# Patient Record
Sex: FEMALE | Race: BLACK OR AFRICAN AMERICAN | NOT HISPANIC OR LATINO | URBAN - METROPOLITAN AREA
[De-identification: names, ages, dates, MRNs, and addresses within clinical notes are randomized per-mention and may not be internally consistent; named-entity substitution may affect disease eponyms.]

---

## 2024-12-13 ENCOUNTER — PHARMACY VISIT (OUTPATIENT)
Dept: PHARMACY | Facility: CLINIC | Age: 39
End: 2024-12-13
Payer: COMMERCIAL

## 2024-12-13 ENCOUNTER — INITIAL PRENATAL (OUTPATIENT)
Dept: OBSTETRICS AND GYNECOLOGY | Facility: CLINIC | Age: 39
End: 2024-12-13
Payer: COMMERCIAL

## 2024-12-13 ENCOUNTER — LAB (OUTPATIENT)
Dept: LAB | Facility: LAB | Age: 39
End: 2024-12-13
Payer: COMMERCIAL

## 2024-12-13 VITALS
DIASTOLIC BLOOD PRESSURE: 101 MMHG | BODY MASS INDEX: 40.52 KG/M2 | SYSTOLIC BLOOD PRESSURE: 170 MMHG | WEIGHT: 243.5 LBS

## 2024-12-13 DIAGNOSIS — O10.911 PRE-EXISTING HYPERTENSION DURING PREGNANCY IN FIRST TRIMESTER, UNSPECIFIED PRE-EXISTING HYPERTENSION TYPE (HHS-HCC): ICD-10-CM

## 2024-12-13 DIAGNOSIS — O09.899 H/O PRETERM DELIVERY, CURRENTLY PREGNANT (HHS-HCC): ICD-10-CM

## 2024-12-13 DIAGNOSIS — N87.1 MODERATE DYSPLASIA OF CERVIX (CIN II): ICD-10-CM

## 2024-12-13 DIAGNOSIS — D86.9 SARCOIDOSIS: ICD-10-CM

## 2024-12-13 DIAGNOSIS — E66.01 OBESITY, CLASS III, BMI 40-49.9 (MORBID OBESITY) (MULTI): ICD-10-CM

## 2024-12-13 DIAGNOSIS — O09.521 MULTIGRAVIDA OF ADVANCED MATERNAL AGE IN FIRST TRIMESTER (HHS-HCC): ICD-10-CM

## 2024-12-13 DIAGNOSIS — O26.851 SPOTTING AFFECTING PREGNANCY IN FIRST TRIMESTER (HHS-HCC): ICD-10-CM

## 2024-12-13 DIAGNOSIS — Z32.01 PREGNANCY TEST POSITIVE (HHS-HCC): ICD-10-CM

## 2024-12-13 DIAGNOSIS — O09.299 HISTORY OF PRE-ECLAMPSIA IN PRIOR PREGNANCY, CURRENTLY PREGNANT (HHS-HCC): ICD-10-CM

## 2024-12-13 DIAGNOSIS — Z32.01 PREGNANCY TEST POSITIVE (HHS-HCC): Primary | ICD-10-CM

## 2024-12-13 DIAGNOSIS — Z3A.01 6 WEEKS GESTATION OF PREGNANCY (HHS-HCC): ICD-10-CM

## 2024-12-13 PROBLEM — E66.813 OBESITY, CLASS III, BMI 40-49.9 (MORBID OBESITY): Status: ACTIVE | Noted: 2022-09-06

## 2024-12-13 PROBLEM — Z87.51 HISTORY OF PRETERM DELIVERY: Status: ACTIVE | Noted: 2024-12-13

## 2024-12-13 LAB
ABO GROUP (TYPE) IN BLOOD: NORMAL
ALBUMIN SERPL BCP-MCNC: 4.3 G/DL (ref 3.4–5)
ALP SERPL-CCNC: 99 U/L (ref 33–110)
ALT SERPL W P-5'-P-CCNC: 43 U/L (ref 7–45)
ANION GAP SERPL CALC-SCNC: 14 MMOL/L (ref 10–20)
ANTIBODY SCREEN: NORMAL
AST SERPL W P-5'-P-CCNC: 35 U/L (ref 9–39)
BILIRUB SERPL-MCNC: 1 MG/DL (ref 0–1.2)
BUN SERPL-MCNC: 7 MG/DL (ref 6–23)
CALCIUM SERPL-MCNC: 9.3 MG/DL (ref 8.6–10.6)
CHLORIDE SERPL-SCNC: 103 MMOL/L (ref 98–107)
CO2 SERPL-SCNC: 24 MMOL/L (ref 21–32)
CREAT SERPL-MCNC: 0.65 MG/DL (ref 0.5–1.05)
EGFRCR SERPLBLD CKD-EPI 2021: >90 ML/MIN/1.73M*2
ERYTHROCYTE [DISTWIDTH] IN BLOOD BY AUTOMATED COUNT: 13.7 % (ref 11.5–14.5)
GLUCOSE SERPL-MCNC: 87 MG/DL (ref 74–99)
HBV SURFACE AG SERPL QL IA: NONREACTIVE
HCT VFR BLD AUTO: 38.2 % (ref 36–46)
HCV AB SER QL: NONREACTIVE
HGB BLD-MCNC: 12.5 G/DL (ref 12–16)
HIV 1+2 AB+HIV1 P24 AG SERPL QL IA: NONREACTIVE
MCH RBC QN AUTO: 28.3 PG (ref 26–34)
MCHC RBC AUTO-ENTMCNC: 32.7 G/DL (ref 32–36)
MCV RBC AUTO: 86 FL (ref 80–100)
NRBC BLD-RTO: 0 /100 WBCS (ref 0–0)
PLATELET # BLD AUTO: 252 X10*3/UL (ref 150–450)
POTASSIUM SERPL-SCNC: 4 MMOL/L (ref 3.5–5.3)
PREGNANCY TEST URINE, POC: POSITIVE
PROT SERPL-MCNC: 7.1 G/DL (ref 6.4–8.2)
RBC # BLD AUTO: 4.42 X10*6/UL (ref 4–5.2)
REFLEX ADDED, ANEMIA PANEL: NORMAL
RH FACTOR (ANTIGEN D): NORMAL
RUBV IGG SERPL IA-ACNC: 2.3 IA
RUBV IGG SERPL QL IA: POSITIVE
SODIUM SERPL-SCNC: 137 MMOL/L (ref 136–145)
TREPONEMA PALLIDUM IGG+IGM AB [PRESENCE] IN SERUM OR PLASMA BY IMMUNOASSAY: NONREACTIVE
WBC # BLD AUTO: 3.2 X10*3/UL (ref 4.4–11.3)

## 2024-12-13 PROCEDURE — 86850 RBC ANTIBODY SCREEN: CPT

## 2024-12-13 PROCEDURE — 87086 URINE CULTURE/COLONY COUNT: CPT | Performed by: STUDENT IN AN ORGANIZED HEALTH CARE EDUCATION/TRAINING PROGRAM

## 2024-12-13 PROCEDURE — 81025 URINE PREGNANCY TEST: CPT | Mod: GC | Performed by: STUDENT IN AN ORGANIZED HEALTH CARE EDUCATION/TRAINING PROGRAM

## 2024-12-13 PROCEDURE — 86900 BLOOD TYPING SEROLOGIC ABO: CPT

## 2024-12-13 PROCEDURE — 86803 HEPATITIS C AB TEST: CPT

## 2024-12-13 PROCEDURE — RXMED WILLOW AMBULATORY MEDICATION CHARGE

## 2024-12-13 PROCEDURE — 86901 BLOOD TYPING SEROLOGIC RH(D): CPT

## 2024-12-13 PROCEDURE — 87389 HIV-1 AG W/HIV-1&-2 AB AG IA: CPT

## 2024-12-13 PROCEDURE — 83021 HEMOGLOBIN CHROMOTOGRAPHY: CPT

## 2024-12-13 PROCEDURE — 85027 COMPLETE CBC AUTOMATED: CPT

## 2024-12-13 PROCEDURE — 87591 N.GONORRHOEAE DNA AMP PROB: CPT | Performed by: STUDENT IN AN ORGANIZED HEALTH CARE EDUCATION/TRAINING PROGRAM

## 2024-12-13 PROCEDURE — 83020 HEMOGLOBIN ELECTROPHORESIS: CPT | Performed by: NURSE PRACTITIONER

## 2024-12-13 PROCEDURE — 80053 COMPREHEN METABOLIC PANEL: CPT

## 2024-12-13 PROCEDURE — 86317 IMMUNOASSAY INFECTIOUS AGENT: CPT

## 2024-12-13 PROCEDURE — 87340 HEPATITIS B SURFACE AG IA: CPT

## 2024-12-13 PROCEDURE — 99214 OFFICE O/P EST MOD 30 MIN: CPT | Performed by: STUDENT IN AN ORGANIZED HEALTH CARE EDUCATION/TRAINING PROGRAM

## 2024-12-13 PROCEDURE — 81025 URINE PREGNANCY TEST: CPT | Performed by: STUDENT IN AN ORGANIZED HEALTH CARE EDUCATION/TRAINING PROGRAM

## 2024-12-13 PROCEDURE — 99214 OFFICE O/P EST MOD 30 MIN: CPT | Mod: GC | Performed by: STUDENT IN AN ORGANIZED HEALTH CARE EDUCATION/TRAINING PROGRAM

## 2024-12-13 PROCEDURE — 86780 TREPONEMA PALLIDUM: CPT

## 2024-12-13 RX ORDER — NIFEDIPINE 30 MG/1
30 TABLET, FILM COATED, EXTENDED RELEASE ORAL
Qty: 30 TABLET | Refills: 11 | Status: SHIPPED | OUTPATIENT
Start: 2024-12-13 | End: 2025-12-13

## 2024-12-13 RX ORDER — ACETAMINOPHEN 500 MG
1 TABLET ORAL DAILY
Qty: 1 KIT | Refills: 0 | Status: SHIPPED | OUTPATIENT
Start: 2024-12-13 | End: 2024-12-13

## 2024-12-13 RX ORDER — ACETAMINOPHEN 500 MG
1 TABLET ORAL DAILY
Qty: 1 EACH | Refills: 0 | Status: SHIPPED | OUTPATIENT
Start: 2024-12-13

## 2024-12-13 RX ORDER — HYDROXYCHLOROQUINE SULFATE 200 MG/1
1 TABLET, FILM COATED ORAL 2 TIMES DAILY
COMMUNITY

## 2024-12-13 ASSESSMENT — ENCOUNTER SYMPTOMS
HEMATOLOGIC/LYMPHATIC NEGATIVE: 0
MUSCULOSKELETAL NEGATIVE: 0
GASTROINTESTINAL NEGATIVE: 0
CARDIOVASCULAR NEGATIVE: 0
ENDOCRINE NEGATIVE: 0
PSYCHIATRIC NEGATIVE: 0
NEUROLOGICAL NEGATIVE: 0
RESPIRATORY NEGATIVE: 0
ALLERGIC/IMMUNOLOGIC NEGATIVE: 0
CONSTITUTIONAL NEGATIVE: 0
EYES NEGATIVE: 0

## 2024-12-13 ASSESSMENT — EDINBURGH POSTNATAL DEPRESSION SCALE (EPDS)
I HAVE FELT SCARED OR PANICKY FOR NO GOOD REASON: NO, NOT AT ALL
I HAVE BEEN ANXIOUS OR WORRIED FOR NO GOOD REASON: NO, NOT AT ALL
TOTAL SCORE: 1
I HAVE BEEN ABLE TO LAUGH AND SEE THE FUNNY SIDE OF THINGS: AS MUCH AS I ALWAYS COULD
THE THOUGHT OF HARMING MYSELF HAS OCCURRED TO ME: NEVER
I HAVE BEEN SO UNHAPPY THAT I HAVE BEEN CRYING: NO, NEVER
I HAVE BLAMED MYSELF UNNECESSARILY WHEN THINGS WENT WRONG: NOT VERY OFTEN
I HAVE LOOKED FORWARD WITH ENJOYMENT TO THINGS: AS MUCH AS I EVER DID
I HAVE BEEN SO UNHAPPY THAT I HAVE HAD DIFFICULTY SLEEPING: NOT AT ALL
I HAVE FELT SAD OR MISERABLE: NO, NOT AT ALL
THINGS HAVE BEEN GETTING ON TOP OF ME: NO, I HAVE BEEN COPING AS WELL AS EVER

## 2024-12-13 NOTE — ASSESSMENT & PLAN NOTE
Patient with history of 36 week  birth P3   Hx of HSIL with [facility] on 3/2024, NAVI 2. Discussed increased risk PTB in this setting     /complementary therapies/nutritionist/outpatient care/rehabilitation therapy

## 2024-12-13 NOTE — ASSESSMENT & PLAN NOTE
[facility] at [facility] 3/2024, NAVI II, neg margins, has not yet had surveillance pap  For pap with cotest at next visit

## 2024-12-13 NOTE — PROGRESS NOTES
Subjective   Julia Denson is a 39 y.o.  at 6w4d with a working estimated date of delivery of 2025, by Last Menstrual Period who presents for an initial prenatal visit. This pregnancy is {pregnancy; planned/unplanned:00014}***.    Patient currently experiencing: {Pregnancy symptoms:54124}  Bleeding or cramping since LMP: {yes/no/unknown:36613}  Taking prenatal vitamin: {yes,no:41787}  Other concerns today:  Ultrasound completed this pregnancy: {YES wildcard/NO:60}  Last pap: ***    OB History    Para Term  AB Living   6 3 2 1 2 1   SAB IAB Ectopic Multiple Live Births   2       1      # Outcome Date GA Lbr Jason/2nd Weight Sex Type Anes PTL Lv   6 Current            5  12    F Vag-Spont None     4 Term 07/31/10    F Vag-Spont None     3 Term 04/10/02    F Vag-Spont EPI  DERRICK   2 SAB            1 SAB              Prior pregnancy complications: {OB pregnancy comprehensive complications :08012}  History of hypertension:  {H/o HTN in pregnancy:83493}    No past medical history on file.   Past Surgical History:   Procedure Laterality Date    OTHER SURGICAL HISTORY  10/04/2018    Anastomosis Of Gallbladder      Social History     Socioeconomic History    Marital status: Single   Tobacco Use    Smoking status: Never    Smokeless tobacco: Never   Substance and Sexual Activity    Alcohol use: Never    Drug use: Never     Social Drivers of Health     Financial Resource Strain: Low Risk  (2024)    Received from [facility]    Overall Financial Resource Strain (CARDIA)     Difficulty of Paying Living Expenses: Not hard at all   Food Insecurity: No Food Insecurity (2024)    Received from [facility]    Hunger Vital Sign     Worried About Running Out of Food in the Last Year: Never true     Ran Out of Food in the Last Year: Never true   Transportation Needs: No Transportation Needs (2024)    Received from [facility]    PRAPARE - Transportation     Lack of Transportation (Medical):  "No     Lack of Transportation (Non-Medical): No   Physical Activity: Insufficiently Active (2024)    Received from [facility]    Exercise Vital Sign     Days of Exercise per Week: 1 day     Minutes of Exercise per Session: 30 min   Stress: No Stress Concern Present (2024)    Received from [facility]    Citizen of [place] of Occupational Health - Occupational Stress Questionnaire     Feeling of Stress : Not at all   Social Connections: Moderately Isolated (2024)    Received from [facility]    Social Connection and Isolation Panel [NHANES]     Frequency of Communication with Friends and Family: More than three times a week     Frequency of Social Gatherings with Friends and Family: Once a week     Attends Jew Services: More than 4 times per year     Active Member of Clubs or Organizations: No     Attends Club or Organization Meetings: Never     Marital Status: Never    Intimate Partner Violence: Not At Risk (2024)    Received from [facility]    Humiliation, Afraid, Rape, and Kick questionnaire     Fear of Current or Ex-Partner: No     Emotionally Abused: No     Physically Abused: No     Sexually Abused: No      [place]  Depression Scale Total: 1    Objective   Physical Exam  Weight: 110 kg (243 lb 8 oz)  TWG: Not found.   Pregravid BMI: Could not be calculated  BP: (!) 168/101 (pluse-75)    OBGyn Exam     Assessment   Problem List Items Addressed This Visit    None  Visit Diagnoses       Pregnancy test positive ([facility])    -  Primary    Relevant Orders    POCT pregnancy, urine manually resulted             Plan   {NOB plan:29119::\"- New OB resources provided and reviewed with particular attention to dietary, travel, and medication restrictions\",\"- Oriented to practice, CNM vs. MD care\",\"- Reviewed bleeding precautions, warning signs, when to call provider; phone number provided\",\"- Routine NOB labs ordered\",\"- Return in *** weeks for routine prenatal care\"}    Pearl SON " Melva, TAY-MODESTO, TAY-CNP

## 2024-12-13 NOTE — LETTER
12/13/2024    To Whom It May Concern:    This is to certify that my patient,Julia Denson is under my care for her pregnancy.    The following guidelines may be used for any dental work:  You may use a local anesthetic with or without Epinephrine.  You may prescribe any Penicillin or Cephalosporin if an antibiotic is necessary. (Dependent on allergy history)  You may take x-rays with a lead apron if necessary.  You may prescribe Tylenol and/or narcotics for pain.  You may extract teeth if necessary.    If you need further information or if you have any concerns, please contact our office.    Sincerely,        Gaye Warren MD   [facility]

## 2024-12-13 NOTE — ASSESSMENT & PLAN NOTE
For universal ASA ppx, discussed this recommendation today, to start at 12w   Discussed exercise recommendations in pregnancy

## 2024-12-13 NOTE — PROGRESS NOTES
Subjective   Patient ID [id number]   Julia Denson is a 39 y.o.  at 6w4d with a working estimated date of delivery of 2025, by Last Menstrual Period who presents for an initial prenatal visit. This pregnancy is unplanned but desired.    Patient noted some pink spotting last night with wiping, has not had any vaginal bleeding since. No cramping, no urinary frequency, urgency, or dysuria. Patient joined by partner today, they are happy about the pregnancy but she plans for tubal ligation after this pregnancy     Her pregnancy is complicated by:  - AMA, to offer cf DNA at next apt  - cHTN, started on nifed 30 at new OB visit, baseline P:C and HELLP labs pending, BP cuff prescribed at NOB visit   - history of PTB at 36 weeks with most recent delivery  - Hx [facility] 3/2024 for HSIL, with resultant NAVI 2, for pap at next apt   - Sarcoidosis, with skin lesions, inflammatory arthritis, no known pulmonary manifestations, on plaquinil and infliximab infusions, discontinued both at positive pregnancy test, now only on plaquinil.   - Obesity, BMI 40 pre preg   - PEC in G1     OB History    Para Term  AB Living   6 3 2 1 2 3   SAB IAB Ectopic Multiple Live Births   2       3      # Outcome Date GA Lbr Jason/2nd Weight Sex Type Anes PTL Lv   6 Current            5  12 36w0d  1.814 kg F Vag-Spont None  DERRICK   4 Term 07/31/10   2.268 kg F Vag-Spont None  DERRICK   3 Term 04/10/02   2.268 kg F Vag-Spont EPI  DERRICK   2 SAB            1 SAB              [employer]  Depression Scale Total: 1    Objective   Physical Exam  Weight: 110 kg (243 lb 8 oz)  Expected Total Weight Gain: Could not be calculated   Pregravid BMI: Could not be calculated  BP: (!) 170/101        Physical Exam  Genitourinary:      Vulva normal.      Genitourinary Comments: Cervix closed, uterus size CWD    HENT:      Head: Normocephalic.      Nose: Nose normal.      Mouth/Throat:      Mouth: Mucous membranes are moist.   Pulmonary:       Effort: Pulmonary effort is normal.   Abdominal:      Palpations: Abdomen is soft.   Musculoskeletal:         General: Normal range of motion.   Neurological:      General: No focal deficit present.      Mental Status: She is alert.   Skin:     General: Skin is warm and dry.      Comments: Healed cutaneous sarcoid lesions on face    Psychiatric:         Mood and Affect: Mood normal.         Behavior: Behavior normal.         Judgment: Judgment normal.       BSUS: limited transabdominal exam, gestational sac visualized, no yolk sac or fetal pole identified     Prenatal Labs  Ordered     Assessment/Plan   Problem List Items Addressed This Visit             ICD-10-CM    Spotting affecting pregnancy in first trimester ([facility]) O26.851     Discussed etiologies including cervicitis, UTI, post-coital, implantation bleeding, and threatened miscarriage. Patient denies recent intercourse, no abdominal pain or cramping, and cervix closed on exam. Urine culture and STI screening pending.           Sarcoidosis D86.9    Pre-existing hypertension during pregnancy in first trimester ([facility]) O10.911     Severe range Bps at 6w NOB visit  Nifed 30 started 12/13   Home BP cuff Rx sent  MFM consult   Baseline HELLP labs and P:C pending          Relevant Medications    NIFEdipine ER (Adalat CC) 30 mg 24 hr tablet    blood pressure test kit-large kit    Other Relevant Orders    Referral to Maternal Fetal Medicine    Protein, Urine Random    Comprehensive Metabolic Panel    Obesity, Class III, BMI 40-49.9 (morbid obesity) (Multi) E66.01     For universal ASA ppx, discussed this recommendation today, to start at 12w   Discussed exercise recommendations in pregnancy          Multigravida of advanced maternal age in first trimester ([facility]) O09.521     To offer CF DNA at next visit           Moderate dysplasia of cervix (NAVI II) N87.1     [facility] at [facility] 3/2024, NAVI II, neg margins, has not yet had surveillance pap  For pap with cotest at next  visit          History of pre-eclampsia in prior pregnancy, currently pregnant ([facility]) O09.299    H/O  delivery, currently pregnant ([facility]) O09.899     Patient with history of 36 week  birth P3   Hx of HSIL with [facility] on 3/2024, NAVI 2. Discussed increased risk PTB in this setting           6 weeks gestation of pregnancy ([facility]) Z3A.01     Gestational sac only visualized on BSUS today, pending TV formal viability ultrasound            Other Visit Diagnoses         Codes    Pregnancy test positive ([facility])    -  Primary Z32.01    Relevant Orders    POCT pregnancy, urine manually resulted (Completed)    C. trachomatis / N. gonorrhoeae, Amplified    Syphilis Screen with Reflex    Type And Screen    Urine Culture    Rubella Antibody, IgG    HIV 1/2 Antigen/Antibody Screen with Reflex to Confirmation    Hepatitis C Antibody    CBC Anemia Panel With Reflex,Pregnancy    Hemoglobin Identification with Path Review    Hepatitis B Surface Antigen    US MAC OB imaging order          For viability TVUS, ordered   Nifed 30 started today   Prenatal Labs ordered  Daily prenatal vitamins prescribed, discussed starting ASA at 12w  Follow up in 2 weeks for return OB to titrate BP meds   For MFM follow up to discuss cHTN, sarcoidosis, history of  birth     Patient seen and dw Dr Robin Warren MD  OB/GYN PGY4

## 2024-12-13 NOTE — LETTER
12/13/2024    To Whom It May Concern:    Julia Denson is being followed for her pregnancy at [facility].  Estimated Date of Delivery: 8/4/25    Sincerely,        Gaye Warren MD  [facility]

## 2024-12-13 NOTE — ASSESSMENT & PLAN NOTE
Discussed etiologies including cervicitis, UTI, post-coital, implantation bleeding, and threatened miscarriage. Patient denies recent intercourse, no abdominal pain or cramping, and cervix closed on exam. Urine culture and STI screening pending.

## 2024-12-13 NOTE — ASSESSMENT & PLAN NOTE
Severe range Bps at 6w NOB visit  Nifed 30 started 12/13   Home BP cuff Rx sent  [facility] consult   Baseline HELLP labs and P:C pending

## 2024-12-14 LAB
C TRACH RRNA SPEC QL NAA+PROBE: NEGATIVE
N GONORRHOEA DNA SPEC QL PROBE+SIG AMP: NEGATIVE

## 2024-12-15 DIAGNOSIS — O23.41 URINARY TRACT INFECTION IN MOTHER DURING FIRST TRIMESTER OF PREGNANCY (HHS-HCC): Primary | ICD-10-CM

## 2024-12-15 LAB — BACTERIA UR CULT: ABNORMAL

## 2024-12-15 RX ORDER — AMOXICILLIN AND CLAVULANATE POTASSIUM 875; 125 MG/1; MG/1
875 TABLET, FILM COATED ORAL EVERY 12 HOURS
Qty: 14 TABLET | Refills: 0 | Status: SHIPPED | OUTPATIENT
Start: 2024-12-15 | End: 2024-12-22

## 2024-12-16 ENCOUNTER — TELEPHONE (OUTPATIENT)
Dept: OBSTETRICS AND GYNECOLOGY | Facility: CLINIC | Age: 39
End: 2024-12-16
Payer: COMMERCIAL

## 2024-12-16 LAB
HEMOGLOBIN A2: 2.7 % (ref 2–3.5)
HEMOGLOBIN A: 97.1 % (ref 95.8–98)
HEMOGLOBIN F: 0.2 % (ref 0–2)
HEMOGLOBIN IDENTIFICATION INTERPRETATION: NORMAL
PATH REVIEW-HGB IDENTIFICATION: NORMAL

## 2024-12-16 NOTE — TELEPHONE ENCOUNTER
TELEPHONE CALL TO PATIENT  Identified by name and date of birth.  Reviewed results and treatment plan  +UTI, Augmentin every 12 hours x 7 days  Patient verbalizes understanding

## 2024-12-16 NOTE — TELEPHONE ENCOUNTER
----- Message from Gaye Warren sent at 12/15/2024  7:26 PM EST -----  Hi,    This patient was diagnosed with a UTI on her urine sample on Friday, and I sent her augmentin to her pharmacy. Are you jamar to call her and let her know? Thank you!  Gaye

## 2024-12-16 NOTE — TELEPHONE ENCOUNTER
----- Message from Gaye Warren sent at 12/13/2024 12:44 PM EST -----  Hi!    I messaged earlier this patient needs to be scheduled in colpo clinic- after looking through her chart more, it looks like she actually had a cone and not just colpo earlier this year, so only needs a repeat pap. Are you able to call her and let her know she does not need a colposcopy right now, but just a pap smear at her next visit?    Thank you!!  Gaye

## 2024-12-16 NOTE — PROGRESS NOTES
I saw and evaluated the patient. I personally obtained the key and critical portions of the history and physical exam or was physically present for key and critical portions performed by the resident/fellow. I reviewed the resident/fellow's documentation and discussed the patient with the resident/fellow. I agree with the resident/fellow's medical decision making as documented in the note.    Rosio Kelly MD

## 2024-12-16 NOTE — TELEPHONE ENCOUNTER
TELEPHONE CALL TO PATIENT  Identified by name and date of birth.  Reviewed new plan  Patient verbalizes understanding

## 2024-12-17 DIAGNOSIS — Z3A.01 LESS THAN 8 WEEKS GESTATION OF PREGNANCY (HHS-HCC): Primary | ICD-10-CM

## 2024-12-18 ENCOUNTER — TELEPHONE (OUTPATIENT)
Dept: OBSTETRICS AND GYNECOLOGY | Facility: CLINIC | Age: 39
End: 2024-12-18
Payer: COMMERCIAL

## 2024-12-18 NOTE — TELEPHONE ENCOUNTER
----- Message from Nurse Gaye PICKENS sent at 12/16/2024  4:50 PM EST -----  Regarding: ultrasound viability  Call from patient, having some light spotting, 7 weeks GA, no pain, doesn't need a pad.  Has ultrasound scheduled 12/19/24.  No history of ectopic, SAB x 2, 3 live births.  Reviewed when to go to ER - heavy bleeding,  abdominal/pelvic pain.      12/17/24- Patient left message, still spotting, no pain, one small clot.  Eb       Reviewed with Dr Warren, to watch bleeding, come for ultrasound.  Go to ER with any heavy bleeding, pain.  Can get bHCG done if wants.  Left message to call RN.     12/18/24 - has not returned call, will watch for ultrasound results tomorrow.     Detail Level: Detailed Quality 110: Preventive Care And Screening: Influenza Immunization: Influenza immunization was not ordered or administered, reason not given

## 2024-12-19 ENCOUNTER — APPOINTMENT (OUTPATIENT)
Dept: RADIOLOGY | Facility: CLINIC | Age: 39
End: 2024-12-19
Payer: COMMERCIAL

## 2024-12-30 ENCOUNTER — APPOINTMENT (OUTPATIENT)
Dept: OBSTETRICS AND GYNECOLOGY | Facility: CLINIC | Age: 39
End: 2024-12-30
Payer: COMMERCIAL

## 2025-01-02 ENCOUNTER — APPOINTMENT (OUTPATIENT)
Dept: MATERNAL FETAL MEDICINE | Facility: CLINIC | Age: 40
End: 2025-01-02
Payer: COMMERCIAL

## 2025-04-29 ENCOUNTER — APPOINTMENT (OUTPATIENT)
Dept: OBSTETRICS AND GYNECOLOGY | Facility: CLINIC | Age: 40
End: 2025-04-29
Payer: COMMERCIAL

## 2025-06-13 ENCOUNTER — APPOINTMENT (OUTPATIENT)
Dept: CARDIOLOGY | Facility: HOSPITAL | Age: 40
End: 2025-06-13
Payer: COMMERCIAL

## 2025-06-13 ENCOUNTER — APPOINTMENT (OUTPATIENT)
Dept: RADIOLOGY | Facility: HOSPITAL | Age: 40
End: 2025-06-13
Payer: COMMERCIAL

## 2025-06-13 ENCOUNTER — HOSPITAL ENCOUNTER (EMERGENCY)
Facility: HOSPITAL | Age: 40
Discharge: HOME | End: 2025-06-13
Attending: STUDENT IN AN ORGANIZED HEALTH CARE EDUCATION/TRAINING PROGRAM
Payer: COMMERCIAL

## 2025-06-13 VITALS
HEART RATE: 70 BPM | RESPIRATION RATE: 19 BRPM | DIASTOLIC BLOOD PRESSURE: 75 MMHG | SYSTOLIC BLOOD PRESSURE: 142 MMHG | BODY MASS INDEX: 36.5 KG/M2 | WEIGHT: 219.36 LBS | OXYGEN SATURATION: 98 % | TEMPERATURE: 98.2 F

## 2025-06-13 DIAGNOSIS — O10.911 PRE-EXISTING HYPERTENSION DURING PREGNANCY IN FIRST TRIMESTER, UNSPECIFIED PRE-EXISTING HYPERTENSION TYPE (HHS-HCC): ICD-10-CM

## 2025-06-13 DIAGNOSIS — G60.9 IDIOPATHIC PERIPHERAL NEUROPATHY: Primary | ICD-10-CM

## 2025-06-13 LAB
ALBUMIN SERPL BCP-MCNC: 4.3 G/DL (ref 3.4–5)
ALP SERPL-CCNC: 90 U/L (ref 33–110)
ALT SERPL W P-5'-P-CCNC: 23 U/L (ref 7–45)
ANION GAP SERPL CALC-SCNC: 12 MMOL/L (ref 10–20)
APTT PPP: 32 SECONDS (ref 26–36)
AST SERPL W P-5'-P-CCNC: 24 U/L (ref 9–39)
B-HCG SERPL-ACNC: 6 MIU/ML
BASOPHILS # BLD AUTO: 0.03 X10*3/UL (ref 0–0.1)
BASOPHILS NFR BLD AUTO: 0.5 %
BILIRUB SERPL-MCNC: 0.8 MG/DL (ref 0–1.2)
BUN SERPL-MCNC: 11 MG/DL (ref 6–23)
CALCIUM SERPL-MCNC: 8.9 MG/DL (ref 8.6–10.3)
CARDIAC TROPONIN I PNL SERPL HS: <3 NG/L (ref 0–13)
CHLORIDE SERPL-SCNC: 106 MMOL/L (ref 98–107)
CO2 SERPL-SCNC: 23 MMOL/L (ref 21–32)
CREAT SERPL-MCNC: 0.83 MG/DL (ref 0.5–1.05)
EGFRCR SERPLBLD CKD-EPI 2021: >90 ML/MIN/1.73M*2
EOSINOPHIL # BLD AUTO: 0.18 X10*3/UL (ref 0–0.7)
EOSINOPHIL NFR BLD AUTO: 3.2 %
ERYTHROCYTE [DISTWIDTH] IN BLOOD BY AUTOMATED COUNT: 12.8 % (ref 11.5–14.5)
GLUCOSE BLD MANUAL STRIP-MCNC: 90 MG/DL (ref 74–99)
GLUCOSE SERPL-MCNC: 93 MG/DL (ref 74–99)
HCT VFR BLD AUTO: 39.9 % (ref 36–46)
HGB BLD-MCNC: 12.7 G/DL (ref 12–16)
IMM GRANULOCYTES # BLD AUTO: 0.02 X10*3/UL (ref 0–0.7)
IMM GRANULOCYTES NFR BLD AUTO: 0.4 % (ref 0–0.9)
INR PPP: 1 (ref 0.9–1.1)
LYMPHOCYTES # BLD AUTO: 2.62 X10*3/UL (ref 1.2–4.8)
LYMPHOCYTES NFR BLD AUTO: 46.1 %
MCH RBC QN AUTO: 28.1 PG (ref 26–34)
MCHC RBC AUTO-ENTMCNC: 31.8 G/DL (ref 32–36)
MCV RBC AUTO: 88 FL (ref 80–100)
MONOCYTES # BLD AUTO: 0.55 X10*3/UL (ref 0.1–1)
MONOCYTES NFR BLD AUTO: 9.7 %
NEUTROPHILS # BLD AUTO: 2.28 X10*3/UL (ref 1.2–7.7)
NEUTROPHILS NFR BLD AUTO: 40.1 %
NRBC BLD-RTO: 0 /100 WBCS (ref 0–0)
PLATELET # BLD AUTO: 277 X10*3/UL (ref 150–450)
POCT GLUCOSE: 90 MG/DL (ref 74–99)
POTASSIUM SERPL-SCNC: 3.3 MMOL/L (ref 3.5–5.3)
PROT SERPL-MCNC: 7.4 G/DL (ref 6.4–8.2)
PROTHROMBIN TIME: 11.3 SECONDS (ref 9.8–12.4)
RBC # BLD AUTO: 4.52 X10*6/UL (ref 4–5.2)
SODIUM SERPL-SCNC: 138 MMOL/L (ref 136–145)
WBC # BLD AUTO: 5.7 X10*3/UL (ref 4.4–11.3)

## 2025-06-13 PROCEDURE — 80053 COMPREHEN METABOLIC PANEL: CPT | Performed by: STUDENT IN AN ORGANIZED HEALTH CARE EDUCATION/TRAINING PROGRAM

## 2025-06-13 PROCEDURE — 36415 COLL VENOUS BLD VENIPUNCTURE: CPT | Performed by: STUDENT IN AN ORGANIZED HEALTH CARE EDUCATION/TRAINING PROGRAM

## 2025-06-13 PROCEDURE — 84702 CHORIONIC GONADOTROPIN TEST: CPT | Performed by: STUDENT IN AN ORGANIZED HEALTH CARE EDUCATION/TRAINING PROGRAM

## 2025-06-13 PROCEDURE — 85610 PROTHROMBIN TIME: CPT | Performed by: STUDENT IN AN ORGANIZED HEALTH CARE EDUCATION/TRAINING PROGRAM

## 2025-06-13 PROCEDURE — 84484 ASSAY OF TROPONIN QUANT: CPT | Performed by: STUDENT IN AN ORGANIZED HEALTH CARE EDUCATION/TRAINING PROGRAM

## 2025-06-13 PROCEDURE — 99285 EMERGENCY DEPT VISIT HI MDM: CPT | Mod: 25 | Performed by: STUDENT IN AN ORGANIZED HEALTH CARE EDUCATION/TRAINING PROGRAM

## 2025-06-13 PROCEDURE — 70450 CT HEAD/BRAIN W/O DYE: CPT | Performed by: RADIOLOGY

## 2025-06-13 PROCEDURE — 93005 ELECTROCARDIOGRAM TRACING: CPT

## 2025-06-13 PROCEDURE — 2500000004 HC RX 250 GENERAL PHARMACY W/ HCPCS (ALT 636 FOR OP/ED): Performed by: STUDENT IN AN ORGANIZED HEALTH CARE EDUCATION/TRAINING PROGRAM

## 2025-06-13 PROCEDURE — 70450 CT HEAD/BRAIN W/O DYE: CPT

## 2025-06-13 PROCEDURE — 96374 THER/PROPH/DIAG INJ IV PUSH: CPT

## 2025-06-13 PROCEDURE — 85730 THROMBOPLASTIN TIME PARTIAL: CPT | Performed by: STUDENT IN AN ORGANIZED HEALTH CARE EDUCATION/TRAINING PROGRAM

## 2025-06-13 PROCEDURE — 85025 COMPLETE CBC W/AUTO DIFF WBC: CPT | Performed by: STUDENT IN AN ORGANIZED HEALTH CARE EDUCATION/TRAINING PROGRAM

## 2025-06-13 PROCEDURE — 82947 ASSAY GLUCOSE BLOOD QUANT: CPT | Mod: 59

## 2025-06-13 RX ORDER — METHOTREXATE 2.5 MG/1
10 TABLET ORAL WEEKLY
COMMUNITY
Start: 2025-05-12

## 2025-06-13 RX ORDER — METHYLPREDNISOLONE 4 MG/1
TABLET ORAL
Qty: 21 TABLET | Refills: 0 | Status: SHIPPED | OUTPATIENT
Start: 2025-06-13 | End: 2025-06-19

## 2025-06-13 RX ORDER — NICARDIPINE HYDROCHLORIDE 0.2 MG/ML
INJECTION INTRAVENOUS
Status: DISCONTINUED
Start: 2025-06-13 | End: 2025-06-13 | Stop reason: WASHOUT

## 2025-06-13 RX ORDER — LABETALOL HYDROCHLORIDE 5 MG/ML
10 INJECTION, SOLUTION INTRAVENOUS ONCE
Status: COMPLETED | OUTPATIENT
Start: 2025-06-13 | End: 2025-06-13

## 2025-06-13 RX ORDER — LABETALOL HYDROCHLORIDE 5 MG/ML
20 INJECTION, SOLUTION INTRAVENOUS ONCE
Status: DISCONTINUED | OUTPATIENT
Start: 2025-06-13 | End: 2025-06-13

## 2025-06-13 RX ORDER — NIFEDIPINE 30 MG/1
30 TABLET, FILM COATED, EXTENDED RELEASE ORAL
Qty: 30 TABLET | Refills: 11 | Status: SHIPPED | OUTPATIENT
Start: 2025-06-13 | End: 2026-06-13

## 2025-06-13 RX ADMIN — LABETALOL HYDROCHLORIDE 10 MG: 5 INJECTION, SOLUTION INTRAVENOUS at 19:33

## 2025-06-13 NOTE — ED TRIAGE NOTES
Patient arrives reporting right hand tingling while at work this afternoon and episode of dizziness while walking to her office. NIH 1.

## 2025-06-13 NOTE — ED PROVIDER NOTES
HPI   Chief Complaint   Patient presents with    Numbness       Patient is a 39-year-old female with past medical history as below presenting today for evaluation of right arm altered sensation.  Began while she was working today and felt dizzy as she was walking.  Ambulatory without difficulty at this time.  Still having tingling mostly in the right hand and then on the inside of the arm.  No history of prior stroke              Patient History   Medical History[1]  Surgical History[2]  Family History[3]  Social History[4]    Physical Exam   ED Triage Vitals [06/13/25 1555]   Temperature Heart Rate Respirations BP   36.8 °C (98.2 °F) (!) 102 18 (!) 227/101      Pulse Ox Temp src Heart Rate Source Patient Position   100 % -- -- --      BP Location FiO2 (%)     -- --       Physical Exam  Vitals and nursing note reviewed.   Constitutional:       Appearance: Normal appearance.   HENT:      Head: Normocephalic and atraumatic.      Nose: Nose normal.      Mouth/Throat:      Mouth: Mucous membranes are moist.   Eyes:      Conjunctiva/sclera: Conjunctivae normal.   Cardiovascular:      Rate and Rhythm: Normal rate and regular rhythm.      Pulses: Normal pulses.      Heart sounds: Normal heart sounds.   Pulmonary:      Effort: Pulmonary effort is normal.      Breath sounds: Normal breath sounds.   Abdominal:      General: Abdomen is flat.      Palpations: Abdomen is soft.      Tenderness: There is no abdominal tenderness. There is no guarding or rebound.   Musculoskeletal:         General: No deformity.   Neurological:      General: No focal deficit present.      Mental Status: She is alert and oriented to person, place, and time. Mental status is at baseline.   Psychiatric:         Mood and Affect: Mood normal.         Behavior: Behavior normal.           ED Course & MDM   ED Course as of 06/14/25 1503   Fri Jun 13, 2025 2102 ECG 12 lead  EKG shows normal sinus rhythm rate 87 normal intervals no ST-T wave changes [SE]       ED Course User Index  [SE] Frank Ramirez MD         Diagnoses as of 06/14/25 1503   Idiopathic peripheral neuropathy                 No data recorded     [place] Coma Scale Score: 15 (06/13/25 1609 : Yamile Pizarro RN)       NIH Stroke Scale: 1 (06/13/25 1700 : Yamile Pizarro RN)                   Medical Decision Making  Patient presents for evaluation of numbness in the hands episode of dizziness high blood pressure.  Given onset of patient's symptoms possibility of stroke was considered and stroke alert was called.  I determined the patient not to be a a candidate for TNK given nondisabling symptoms So neurology was not consulted.  Blood pressure initially improves spontaneously then came back patient requested medication for blood pressure and was given.  Patient has a history of hypertension but is not taking medications because she believed it was only related to pregnancy.  In fact it was the opposite.  Will reorder her nifedipine.  Supportive care described follow-up with primary care.    Amount and/or Complexity of Data Reviewed  Labs: ordered.  Radiology: ordered.  ECG/medicine tests: ordered and independent interpretation performed. Decision-making details documented in ED Course.    Risk  Prescription drug management.  Drug therapy requiring intensive monitoring for toxicity.        Procedure  Critical Care    Performed by: Frank Ramirez MD  Authorized by: Frank Ramirez MD    Critical care provider statement:     Critical care time (minutes):  20    Critical care time was exclusive of:  Separately billable procedures and treating other patients and teaching time    Critical care was necessary to treat or prevent imminent or life-threatening deterioration of the following conditions:  CNS failure or compromise    Critical care was time spent personally by me on the following activities:  Ordering and performing treatments and interventions, development of treatment plan with  patient or surrogate, discussions with primary provider, discussions with consultants, evaluation of patient's response to treatment, examination of patient, ordering and review of laboratory studies, ordering and review of radiographic studies, pulse oximetry, re-evaluation of patient's condition and review of old charts    Care discussed with: admitting provider           [1]   Past Medical History:  Diagnosis Date    Hypertension     Obesity     Sarcoidosis    [2]   Past Surgical History:  Procedure Laterality Date    OTHER SURGICAL HISTORY  10/04/2018    Anastomosis Of Gallbladder   [3] No family history on file.  [4]   Social History  Tobacco Use    Smoking status: Never    Smokeless tobacco: Never   Substance Use Topics    Alcohol use: Never    Drug use: Never        Frank Ramirez MD  06/14/25 [id number]

## 2025-06-14 ENCOUNTER — CLINICAL SUPPORT (OUTPATIENT)
Dept: EMERGENCY MEDICINE | Facility: HOSPITAL | Age: 40
End: 2025-06-14
Payer: COMMERCIAL

## 2025-06-14 ENCOUNTER — HOSPITAL ENCOUNTER (EMERGENCY)
Facility: HOSPITAL | Age: 40
Discharge: HOME | End: 2025-06-14
Attending: STUDENT IN AN ORGANIZED HEALTH CARE EDUCATION/TRAINING PROGRAM
Payer: COMMERCIAL

## 2025-06-14 ENCOUNTER — APPOINTMENT (OUTPATIENT)
Dept: RADIOLOGY | Facility: HOSPITAL | Age: 40
End: 2025-06-14
Payer: COMMERCIAL

## 2025-06-14 VITALS
RESPIRATION RATE: 16 BRPM | DIASTOLIC BLOOD PRESSURE: 89 MMHG | WEIGHT: 240 LBS | SYSTOLIC BLOOD PRESSURE: 152 MMHG | HEART RATE: 62 BPM | HEIGHT: 65 IN | BODY MASS INDEX: 39.99 KG/M2 | TEMPERATURE: 99.1 F | OXYGEN SATURATION: 100 %

## 2025-06-14 DIAGNOSIS — Z34.90 PREGNANCY, UNSPECIFIED GESTATIONAL AGE (HHS-HCC): ICD-10-CM

## 2025-06-14 DIAGNOSIS — Z32.01: ICD-10-CM

## 2025-06-14 DIAGNOSIS — I10 HYPERTENSION, UNSPECIFIED TYPE: Primary | ICD-10-CM

## 2025-06-14 LAB
ATRIAL RATE: 69 BPM
P AXIS: 38 DEGREES
P OFFSET: 181 MS
P ONSET: 144 MS
PR INTERVAL: 152 MS
PREGNANCY TEST URINE, POC: NEGATIVE
Q ONSET: 220 MS
QRS COUNT: 11 BEATS
QRS DURATION: 76 MS
QT INTERVAL: 418 MS
QTC CALCULATION(BAZETT): 447 MS
QTC FREDERICIA: 438 MS
R AXIS: 23 DEGREES
T AXIS: 27 DEGREES
T OFFSET: 429 MS
VENTRICULAR RATE: 69 BPM

## 2025-06-14 PROCEDURE — 72125 CT NECK SPINE W/O DYE: CPT

## 2025-06-14 PROCEDURE — 72125 CT NECK SPINE W/O DYE: CPT | Performed by: RADIOLOGY

## 2025-06-14 PROCEDURE — 2500000001 HC RX 250 WO HCPCS SELF ADMINISTERED DRUGS (ALT 637 FOR MEDICARE OP): Mod: SE

## 2025-06-14 PROCEDURE — 99285 EMERGENCY DEPT VISIT HI MDM: CPT | Performed by: STUDENT IN AN ORGANIZED HEALTH CARE EDUCATION/TRAINING PROGRAM

## 2025-06-14 PROCEDURE — 99284 EMERGENCY DEPT VISIT MOD MDM: CPT | Mod: 25 | Performed by: STUDENT IN AN ORGANIZED HEALTH CARE EDUCATION/TRAINING PROGRAM

## 2025-06-14 PROCEDURE — 81025 URINE PREGNANCY TEST: CPT

## 2025-06-14 PROCEDURE — 93005 ELECTROCARDIOGRAM TRACING: CPT

## 2025-06-14 RX ORDER — AMLODIPINE BESYLATE 5 MG/1
5 TABLET ORAL DAILY
Qty: 30 TABLET | Refills: 0 | Status: SHIPPED | OUTPATIENT
Start: 2025-06-14 | End: 2025-07-14

## 2025-06-14 RX ORDER — AMLODIPINE BESYLATE 5 MG/1
5 TABLET ORAL ONCE
Status: COMPLETED | OUTPATIENT
Start: 2025-06-14 | End: 2025-06-14

## 2025-06-14 RX ADMIN — AMLODIPINE BESYLATE 5 MG: 5 TABLET ORAL at 07:58

## 2025-06-14 ASSESSMENT — PAIN - FUNCTIONAL ASSESSMENT: PAIN_FUNCTIONAL_ASSESSMENT: 0-10

## 2025-06-14 ASSESSMENT — PAIN SCALES - GENERAL: PAINLEVEL_OUTOF10: 0 - NO PAIN

## 2025-06-14 NOTE — ED TRIAGE NOTES
Was at work yesterday and R hand began tingling.  Had episode of dizziness was seen at [facility] ED BP was elevated.  Was discharge.  Hand started tingling returned around midnight checked BP @ 0300 189/111.

## 2025-06-14 NOTE — ED PROVIDER NOTES
History of Present Illness     History provided by: Patient  Limitations to History: None  External Records Reviewed with Brief Summary: Patient seen at [facility] yesterday, 6/13/2025, for right arm altered sensation.  Blood pressure at that time was 227/101.  Labs yesterday showed a potassium of 3.3 but CMP was otherwise unremarkable.  Beta-hCG was 6.  Coags within normal limits.  CBC unremarkable.  CT head showed no acute intracranial hemorrhage or mass effect.  She received labetalol 10 mg IV and discharged home with nifedipine ER prescription.    HPI:  Julia Denson is a 39 y.o. female with sarcoidosis on infliximab infusions and hypertension presenting with concern of hypertension and bilateral hand tingling.  Patient was seen at [facility] ED yesterday with findings as noted above.  She was unable to obtain the prescription that was sent and when she checked her blood pressure around midnight this morning noted the systolics to be in the 180s.  She states her blood pressure does not normally run this high such as when she had her infliximab infusion earlier this week the systolics were in the 120s.  Patient denies chest pain, shortness of breath, abdominal pain, back pain, changes in her urinary output.  She has also been having bilateral hand tingling.  The tingling initially started in her right hand but has since spread to both hands.  It is intermittent and has spread up her right arm.  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  When she does have the tingling, the sensation is spreading up the medial aspect of her right arm.      Physical Exam   Triage vitals:  T 37.3 °C (99.1 °F)  HR 80  BP (!) 179/104  RR 20  O2 97 % None (Room air)    General: Awake, alert, in no acute distress  Eyes: Gaze conjugate. EOMI. No scleral icterus or injection.  PERRLA.  HENT: Normo-cephalic, atraumatic. No stridor.   CV: Regular rate, regular rhythm. Radial pulses 2+  bilaterally. No murmurs.  Resp: Breathing non-labored, speaking in full sentences.  Clear to auscultation bilaterally. No wheezing, rales, rhonchi.   GI: Soft, non-distended, non-tender. No rebound or guarding.   MSK/Extremities: No gross bony deformities. Moving all extremities. No edema.  Negative Tinel's test and negative Phalen's test bilaterally.  Skin: Warm. Appropriate color.   Neuro: Alert and oriented x3.  Facial expression symmetric.  Speech is fluent.  5/5 motor strength in bilateral upper and lower extremities.  Sensation intact in all extremities.  Finger-nose normal.  Psych: Appropriate mood and affect      Medical Decision Making & ED Course   Medical Decision Makin y.o. female with sarcoidosis and hypertension presenting with concern of her hypertension and bilateral hand numbness and tingling.  Patient is hypertensive at 179/104 in triage.  Exam is noted as above.  Given negative labs and CT head yesterday, lower concern for CVA, hypertensive emergency.  With numbness and tingling in the hands and ulnar distribution, spinal cord stenosis or C-spine pathology and carpal tunnel are on the differential.  Will obtain CT C-spine to assess.  With her beta-hCG being 6 yesterday, will will obtain point-of-care urine pregnancy.  If negative, will discuss with patient regarding obtaining outpatient beta-hCG and follow-up with OB/GYN.  Will give amlodipine 5 mg here in the ED and send prescription for 30-day supply until patient is able to establish care with a PCP for which referral has been provided.    Patient C-spine CT did not show any acute fractures or osseous abnormalities that could be the etiology of her symptoms.  These findings were discussed with the patient.  She was agreeable for discharge home with outpatient follow-up with her primary care physician for continued management.  Her blood pressure was noted to decrease after receiving the amlodipine.  Prescription sent to her pharmacy.   Patient will be discharged in stable condition with return precautions.      ED Course:  ED Course as of 06/14/25 1954   Sat Jun 14, 2025   0740 EKG obtained at 06:20 independently interpreted by me.  It demonstrates normal sinus rhythm with rate of 69.  Normal axis.  Intervals appropriate with MS interval 152, QRS 76, QTc 447. [AC]   0853 Preg Test, Ur: Negative  Beta-HCG 6 at OSH on 6/13/24; urine preg negative. She is currently menstruating.  Will have patient follow-up for repeat beta-hCG in 48 hours and follow-up with OB/GYN as needed.  Concerns for this elevation including malignancy, perimenopausal, early pregnancy not detected by urine pregnancy test. [AC]   0942 Emergency Medicine Senior Resident Attestation:     The patient was seen by the resident.  I have personally performed a substantive portion of the encounter.  I have seen and examined the patient; agree with the workup, evaluation, MDM, management and diagnosis.  The care plan has been discussed with the resident.  This patient was seen in conjunction with the supervision of the attending physician.    39 yof with Pmhx of sarcoidosis who is presenting with a hypertension. She was reporting numbness in the ulnar distribution in the bilateral hands.  Vitals notable for hypertension.  Patient's paresthesias are in a C8 distribution in the bilateral hands.  Patient was good motor function.  5/5 strength  In bilateral upper.  Given bilateral distribution concern for potentially cervical.  Patient had a CT head yesterday that was negative do not believe that reimaging at this time would be beneficial.  Also considered a peripheral neuropathy considering a radial nerve.  If the CT C-spine is negative patient will be able to follow-up given full motor function is intact.  Do not believe that an MRI at this time is needed however patient will likely need to follow-up with a primary care physician work return if symptoms persist.  Patient's beta-hCG was 6  yesterday.  She reports she is currently on her period.  Will be advised to follow-up with a OB as well as a 72-hour beta-hCG to confirm that it will decrease.  Had a negative urine pregnancy today.        Dora Murray MD  PGY-3   [OTIS]      ED Course User Index  [AC] Damon Garg DO  [OTIS] Dora Murray MD         Diagnoses as of 06/14/25 1954   Hypertension, unspecified type       EKG Independent Interpretation: EKG interpreted by myself. Please see ED Course for full interpretation.      The patient was discussed with the following consultants/services: None  ----        Disposition   As a result of the work-up, the patient was discharged home.  she was informed of her diagnosis and instructed to come back with any concerns or worsening of condition.  she and was agreeable to the plan as discussed above.  she was given the opportunity to ask questions.  All of the patient's questions were answered.    Procedures   Procedures        Damon Garg DO  Emergency Medicine PGY1      ---------------------------------------------------------    ATTENDING NOTE for Kennedy Roach MD:    ATTENDING ATTESTATION:  The patient was seen by the resident/fellow.  I have personally performed a substantive portion of the encounter.  I have seen and examined the patient; agree with the workup, evaluation, MDM, management and diagnosis.  The care plan has been discussed with the resident/fellow; I have reviewed the resident/fellow´s note and agree with the documented findings with the exception/addition of the following:    Patient is a young woman who presents with paresthesias in bilateral hands predominantly over the C8 nerve distribution.  Having the patient flex her wrists and palpating the carpal tunnel does not reproduce her symptoms.  Patient is neurovascularly intact otherwise.  Denies any head injuries or neck pain.  She was seen yesterday with similar symptoms and had a CT of the head that was reassuring as well  as reassuring blood work aside from an hCG that was slightly detectable at 6.  Patient does not believe she is pregnant believe she is currently on her menstrual cycle.  Pregnancy test today was negative and we got a CT of the neck to investigate for potential occult injuries that are causing some of her paresthesias.  The CT is reassuring and believe she is a for discharge with her primary doctor and return precautions for any progression that could indicate cord compression syndrome and or need for emergent MRI.  Patient was hypertensive but has no symptoms of hypertensive emergency so we will start her on amlodipine and recommend outpatient follow-up for further assessment and care and titration of blood pressure.  I also recommend following up with OB regarding her hCG given that it could indicate that she is just becoming pregnant but could also indicate that she has an occult malignancy.  We explained this to the patient emphasized the need to follow-up.    ---------------------------------------------------------     Damon Garg DO  Resident  06/14/25 1954

## 2025-06-14 NOTE — DISCHARGE INSTRUCTIONS
Prescription for amlodipine has been sent to your pharmacy.  Labs for a repeat beta-hCG has been sent, obtain this lab in the next 48 hours here at .  Recommend follow-up with your primary care physician, referral has been sent.  If you have any new or worsening symptoms you can always return to the ED for further evaluation.    You can try wrist braces at night for your tingling in your hands as this may improve your symptoms.

## 2025-06-16 LAB
ATRIAL RATE: 87 BPM
P AXIS: 43 DEGREES
P OFFSET: 183 MS
P ONSET: 128 MS
PR INTERVAL: 168 MS
Q ONSET: 212 MS
QRS COUNT: 15 BEATS
QRS DURATION: 92 MS
QT INTERVAL: 390 MS
QTC CALCULATION(BAZETT): 469 MS
QTC FREDERICIA: 441 MS
R AXIS: -12 DEGREES
T AXIS: 46 DEGREES
T OFFSET: 407 MS
VENTRICULAR RATE: 87 BPM

## 2025-07-14 ENCOUNTER — APPOINTMENT (OUTPATIENT)
Dept: PRIMARY CARE | Facility: CLINIC | Age: 40
End: 2025-07-14
Payer: COMMERCIAL

## 2025-07-14 VITALS
DIASTOLIC BLOOD PRESSURE: 78 MMHG | OXYGEN SATURATION: 97 % | HEIGHT: 65 IN | WEIGHT: 244 LBS | BODY MASS INDEX: 40.65 KG/M2 | SYSTOLIC BLOOD PRESSURE: 138 MMHG | HEART RATE: 65 BPM

## 2025-07-14 DIAGNOSIS — I10 HYPERTENSION, UNSPECIFIED TYPE: ICD-10-CM

## 2025-07-14 DIAGNOSIS — Z76.89 ENCOUNTER TO ESTABLISH CARE: Primary | ICD-10-CM

## 2025-07-14 DIAGNOSIS — Z12.31 ENCOUNTER FOR SCREENING MAMMOGRAM FOR MALIGNANT NEOPLASM OF BREAST: ICD-10-CM

## 2025-07-14 PROCEDURE — 3075F SYST BP GE 130 - 139MM HG: CPT

## 2025-07-14 PROCEDURE — 3008F BODY MASS INDEX DOCD: CPT

## 2025-07-14 PROCEDURE — 99204 OFFICE O/P NEW MOD 45 MIN: CPT

## 2025-07-14 PROCEDURE — 3078F DIAST BP <80 MM HG: CPT

## 2025-07-14 ASSESSMENT — ENCOUNTER SYMPTOMS
CHEST TIGHTNESS: 0
VOMITING: 0
NAUSEA: 0
EYE REDNESS: 0
HEADACHES: 0
CONSTIPATION: 0
WEAKNESS: 0
DIFFICULTY URINATING: 0
BRUISES/BLEEDS EASILY: 0
POLYDIPSIA: 0
CONFUSION: 0
ABDOMINAL PAIN: 0
DEPRESSION: 0
EYE ITCHING: 0
DYSURIA: 0
FATIGUE: 0
DIARRHEA: 0
DIZZINESS: 0
BACK PAIN: 0
CHILLS: 0
OCCASIONAL FEELINGS OF UNSTEADINESS: 0
BLOOD IN STOOL: 0
PALPITATIONS: 0
FREQUENCY: 0
LOSS OF SENSATION IN FEET: 0
LIGHT-HEADEDNESS: 0
SHORTNESS OF BREATH: 0
UNEXPECTED WEIGHT CHANGE: 0
SEIZURES: 0

## 2025-07-14 ASSESSMENT — PAIN SCALES - GENERAL: PAINLEVEL_OUTOF10: 0-NO PAIN

## 2025-07-14 ASSESSMENT — PATIENT HEALTH QUESTIONNAIRE - PHQ9
SUM OF ALL RESPONSES TO PHQ9 QUESTIONS 1 AND 2: 0
1. LITTLE INTEREST OR PLEASURE IN DOING THINGS: NOT AT ALL
2. FEELING DOWN, DEPRESSED OR HOPELESS: NOT AT ALL
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
## 2025-07-16 RX ORDER — ACETAMINOPHEN 500 MG
1 TABLET ORAL DAILY
Qty: 1 EACH | Refills: 0 | Status: SHIPPED | OUTPATIENT
Start: 2025-07-16

## 2025-07-21 ASSESSMENT — ENCOUNTER SYMPTOMS
NERVOUS/ANXIOUS: 0
HEMATURIA: 0
NUMBNESS: 0
WHEEZING: 0
COUGH: 0
SINUS PRESSURE: 0
JOINT SWELLING: 0
RHINORRHEA: 0
SINUS PAIN: 0
NECK PAIN: 0